# Patient Record
Sex: FEMALE | Race: WHITE | NOT HISPANIC OR LATINO | Employment: OTHER | URBAN - METROPOLITAN AREA
[De-identification: names, ages, dates, MRNs, and addresses within clinical notes are randomized per-mention and may not be internally consistent; named-entity substitution may affect disease eponyms.]

---

## 2017-02-15 ENCOUNTER — CONVERSION ENCOUNTER (OUTPATIENT)
Dept: SURGERY | Facility: CLINIC | Age: 82
End: 2017-02-15

## 2018-02-21 ENCOUNTER — CONVERSION ENCOUNTER (OUTPATIENT)
Dept: SURGERY | Facility: CLINIC | Age: 83
End: 2018-02-21

## 2018-03-06 ENCOUNTER — CONVERSION ENCOUNTER (OUTPATIENT)
Dept: SURGERY | Facility: CLINIC | Age: 83
End: 2018-03-06

## 2018-06-14 ENCOUNTER — CONVERSION ENCOUNTER (OUTPATIENT)
Dept: SURGERY | Facility: CLINIC | Age: 83
End: 2018-06-14

## 2019-02-27 ENCOUNTER — CONVERSION ENCOUNTER (OUTPATIENT)
Dept: SURGERY | Facility: CLINIC | Age: 84
End: 2019-02-27

## 2019-06-04 VITALS
HEART RATE: 61 BPM | HEART RATE: 54 BPM | WEIGHT: 156 LBS | WEIGHT: 150 LBS | OXYGEN SATURATION: 97 % | DIASTOLIC BLOOD PRESSURE: 63 MMHG | DIASTOLIC BLOOD PRESSURE: 68 MMHG | OXYGEN SATURATION: 96 % | WEIGHT: 144 LBS | HEART RATE: 66 BPM | HEIGHT: 65 IN | DIASTOLIC BLOOD PRESSURE: 82 MMHG | HEART RATE: 59 BPM | BODY MASS INDEX: 24.99 KG/M2 | SYSTOLIC BLOOD PRESSURE: 136 MMHG | BODY MASS INDEX: 25.13 KG/M2 | OXYGEN SATURATION: 95 % | DIASTOLIC BLOOD PRESSURE: 81 MMHG | HEART RATE: 53 BPM | OXYGEN SATURATION: 94 % | SYSTOLIC BLOOD PRESSURE: 131 MMHG | DIASTOLIC BLOOD PRESSURE: 82 MMHG | SYSTOLIC BLOOD PRESSURE: 146 MMHG | BODY MASS INDEX: 25.49 KG/M2 | WEIGHT: 151 LBS | SYSTOLIC BLOOD PRESSURE: 143 MMHG | WEIGHT: 153 LBS | OXYGEN SATURATION: 97 % | HEIGHT: 65 IN | SYSTOLIC BLOOD PRESSURE: 169 MMHG

## 2020-06-28 ENCOUNTER — APPOINTMENT (OUTPATIENT)
Dept: CT IMAGING | Facility: HOSPITAL | Age: 85
End: 2020-06-28

## 2020-06-28 ENCOUNTER — APPOINTMENT (OUTPATIENT)
Dept: GENERAL RADIOLOGY | Facility: HOSPITAL | Age: 85
End: 2020-06-28

## 2020-06-28 ENCOUNTER — HOSPITAL ENCOUNTER (INPATIENT)
Facility: HOSPITAL | Age: 85
LOS: 2 days | Discharge: NURSING, REHAB OR LONG-TERM CARE | End: 2020-07-01
Attending: EMERGENCY MEDICINE | Admitting: INTERNAL MEDICINE

## 2020-06-28 DIAGNOSIS — G45.9 TIA (TRANSIENT ISCHEMIC ATTACK): Primary | ICD-10-CM

## 2020-06-28 LAB
ALBUMIN SERPL-MCNC: 4.4 G/DL (ref 3.5–5.2)
ALBUMIN/GLOB SERPL: 2.3 G/DL
ALP SERPL-CCNC: 70 U/L (ref 39–117)
ALT SERPL W P-5'-P-CCNC: 14 U/L (ref 1–33)
ANION GAP SERPL CALCULATED.3IONS-SCNC: 9 MMOL/L (ref 5–15)
APTT PPP: 24.8 SECONDS (ref 24–31)
AST SERPL-CCNC: 18 U/L (ref 1–32)
BASOPHILS # BLD AUTO: 0 10*3/MM3 (ref 0–0.2)
BASOPHILS NFR BLD AUTO: 0.7 % (ref 0–1.5)
BILIRUB SERPL-MCNC: 0.5 MG/DL (ref 0.2–1.2)
BUN BLD-MCNC: 21 MG/DL (ref 8–23)
BUN BLD-MCNC: ABNORMAL MG/DL
BUN/CREAT SERPL: ABNORMAL
CALCIUM SPEC-SCNC: 9.6 MG/DL (ref 8.6–10.5)
CHLORIDE SERPL-SCNC: 104 MMOL/L (ref 98–107)
CO2 SERPL-SCNC: 30 MMOL/L (ref 22–29)
CREAT BLD-MCNC: 0.85 MG/DL (ref 0.57–1)
DEPRECATED RDW RBC AUTO: 47.3 FL (ref 37–54)
EOSINOPHIL # BLD AUTO: 0.2 10*3/MM3 (ref 0–0.4)
EOSINOPHIL NFR BLD AUTO: 2.2 % (ref 0.3–6.2)
ERYTHROCYTE [DISTWIDTH] IN BLOOD BY AUTOMATED COUNT: 14.8 % (ref 12.3–15.4)
GFR SERPL CREATININE-BSD FRML MDRD: 64 ML/MIN/1.73
GLOBULIN UR ELPH-MCNC: 1.9 GM/DL
GLUCOSE BLD-MCNC: 84 MG/DL (ref 65–99)
GLUCOSE BLDC GLUCOMTR-MCNC: 92 MG/DL (ref 70–105)
HCT VFR BLD AUTO: 35.1 % (ref 34–46.6)
HGB BLD-MCNC: 12.1 G/DL (ref 12–15.9)
HOLD SPECIMEN: NORMAL
INR PPP: 0.99 (ref 0.9–1.1)
LYMPHOCYTES # BLD AUTO: 0.9 10*3/MM3 (ref 0.7–3.1)
LYMPHOCYTES NFR BLD AUTO: 12.1 % (ref 19.6–45.3)
MCH RBC QN AUTO: 31.1 PG (ref 26.6–33)
MCHC RBC AUTO-ENTMCNC: 34.4 G/DL (ref 31.5–35.7)
MCV RBC AUTO: 90.2 FL (ref 79–97)
MONOCYTES # BLD AUTO: 0.7 10*3/MM3 (ref 0.1–0.9)
MONOCYTES NFR BLD AUTO: 9.9 % (ref 5–12)
NEUTROPHILS # BLD AUTO: 5.3 10*3/MM3 (ref 1.7–7)
NEUTROPHILS NFR BLD AUTO: 75.1 % (ref 42.7–76)
NRBC BLD AUTO-RTO: 0 /100 WBC (ref 0–0.2)
PLATELET # BLD AUTO: 217 10*3/MM3 (ref 140–450)
PMV BLD AUTO: 8 FL (ref 6–12)
POTASSIUM BLD-SCNC: 4.5 MMOL/L (ref 3.5–5.2)
PROT SERPL-MCNC: 6.3 G/DL (ref 6–8.5)
PROTHROMBIN TIME: 10.4 SECONDS (ref 9.6–11.7)
RBC # BLD AUTO: 3.89 10*6/MM3 (ref 3.77–5.28)
SODIUM BLD-SCNC: 143 MMOL/L (ref 136–145)
T4 FREE SERPL-MCNC: 1.06 NG/DL (ref 0.93–1.7)
TROPONIN T SERPL-MCNC: <0.01 NG/ML (ref 0–0.03)
WBC NRBC COR # BLD: 7 10*3/MM3 (ref 3.4–10.8)
WHOLE BLOOD HOLD SPECIMEN: NORMAL
WHOLE BLOOD HOLD SPECIMEN: NORMAL

## 2020-06-28 PROCEDURE — G0378 HOSPITAL OBSERVATION PER HR: HCPCS

## 2020-06-28 PROCEDURE — 71045 X-RAY EXAM CHEST 1 VIEW: CPT

## 2020-06-28 PROCEDURE — 85730 THROMBOPLASTIN TIME PARTIAL: CPT | Performed by: EMERGENCY MEDICINE

## 2020-06-28 PROCEDURE — 84439 ASSAY OF FREE THYROXINE: CPT | Performed by: PSYCHIATRY & NEUROLOGY

## 2020-06-28 PROCEDURE — 80053 COMPREHEN METABOLIC PANEL: CPT | Performed by: EMERGENCY MEDICINE

## 2020-06-28 PROCEDURE — 93005 ELECTROCARDIOGRAM TRACING: CPT | Performed by: EMERGENCY MEDICINE

## 2020-06-28 PROCEDURE — 84484 ASSAY OF TROPONIN QUANT: CPT | Performed by: EMERGENCY MEDICINE

## 2020-06-28 PROCEDURE — 99285 EMERGENCY DEPT VISIT HI MDM: CPT

## 2020-06-28 PROCEDURE — 70450 CT HEAD/BRAIN W/O DYE: CPT

## 2020-06-28 PROCEDURE — 85610 PROTHROMBIN TIME: CPT | Performed by: EMERGENCY MEDICINE

## 2020-06-28 PROCEDURE — 99219 PR INITIAL OBSERVATION CARE/DAY 50 MINUTES: CPT | Performed by: HOSPITALIST

## 2020-06-28 PROCEDURE — 94640 AIRWAY INHALATION TREATMENT: CPT

## 2020-06-28 PROCEDURE — 36415 COLL VENOUS BLD VENIPUNCTURE: CPT

## 2020-06-28 PROCEDURE — 0 IOPAMIDOL PER 1 ML: Performed by: EMERGENCY MEDICINE

## 2020-06-28 PROCEDURE — 85025 COMPLETE CBC W/AUTO DIFF WBC: CPT | Performed by: EMERGENCY MEDICINE

## 2020-06-28 PROCEDURE — 82962 GLUCOSE BLOOD TEST: CPT

## 2020-06-28 PROCEDURE — 70496 CT ANGIOGRAPHY HEAD: CPT

## 2020-06-28 PROCEDURE — 70498 CT ANGIOGRAPHY NECK: CPT

## 2020-06-28 PROCEDURE — 99221 1ST HOSP IP/OBS SF/LOW 40: CPT | Performed by: PSYCHIATRY & NEUROLOGY

## 2020-06-28 RX ORDER — SENNOSIDES 8.6 MG
650 CAPSULE ORAL 2 TIMES DAILY
COMMUNITY

## 2020-06-28 RX ORDER — LISINOPRIL 5 MG/1
15 TABLET ORAL DAILY
COMMUNITY

## 2020-06-28 RX ORDER — ASPIRIN 81 MG/1
81 TABLET ORAL DAILY
Status: DISCONTINUED | OUTPATIENT
Start: 2020-06-28 | End: 2020-07-01 | Stop reason: HOSPADM

## 2020-06-28 RX ORDER — GUAIFENESIN AND DEXTROMETHORPHAN HYDROBROMIDE 100; 10 MG/5ML; MG/5ML
10 SOLUTION ORAL EVERY 4 HOURS PRN
COMMUNITY

## 2020-06-28 RX ORDER — IBUPROFEN 400 MG/1
400 TABLET ORAL 2 TIMES DAILY PRN
COMMUNITY

## 2020-06-28 RX ORDER — CHOLECALCIFEROL (VITAMIN D3) 125 MCG
10 CAPSULE ORAL NIGHTLY
Status: DISCONTINUED | OUTPATIENT
Start: 2020-06-28 | End: 2020-07-01 | Stop reason: HOSPADM

## 2020-06-28 RX ORDER — SODIUM CHLORIDE 0.9 % (FLUSH) 0.9 %
10 SYRINGE (ML) INJECTION AS NEEDED
Status: DISCONTINUED | OUTPATIENT
Start: 2020-06-28 | End: 2020-07-01 | Stop reason: HOSPADM

## 2020-06-28 RX ORDER — MEMANTINE HYDROCHLORIDE 10 MG/1
10 TABLET ORAL 2 TIMES DAILY
Status: DISCONTINUED | OUTPATIENT
Start: 2020-06-28 | End: 2020-07-01 | Stop reason: HOSPADM

## 2020-06-28 RX ORDER — METOPROLOL TARTRATE 50 MG/1
50 TABLET, FILM COATED ORAL 2 TIMES DAILY
COMMUNITY

## 2020-06-28 RX ORDER — PANTOPRAZOLE SODIUM 40 MG/1
40 TABLET, DELAYED RELEASE ORAL DAILY
Status: DISCONTINUED | OUTPATIENT
Start: 2020-06-29 | End: 2020-07-01 | Stop reason: HOSPADM

## 2020-06-28 RX ORDER — LANOLIN ALCOHOL/MO/W.PET/CERES
9 CREAM (GRAM) TOPICAL NIGHTLY
COMMUNITY

## 2020-06-28 RX ORDER — CLOPIDOGREL BISULFATE 75 MG/1
75 TABLET ORAL DAILY
Status: DISCONTINUED | OUTPATIENT
Start: 2020-06-28 | End: 2020-07-01 | Stop reason: HOSPADM

## 2020-06-28 RX ORDER — ATORVASTATIN CALCIUM 40 MG/1
40 TABLET, FILM COATED ORAL NIGHTLY
Status: DISCONTINUED | OUTPATIENT
Start: 2020-06-28 | End: 2020-07-01 | Stop reason: HOSPADM

## 2020-06-28 RX ORDER — FLUTICASONE PROPIONATE 50 MCG
2 SPRAY, SUSPENSION (ML) NASAL DAILY
COMMUNITY

## 2020-06-28 RX ORDER — MEMANTINE HYDROCHLORIDE 10 MG/1
10 TABLET ORAL 2 TIMES DAILY
COMMUNITY

## 2020-06-28 RX ORDER — ATORVASTATIN CALCIUM 40 MG/1
80 TABLET, FILM COATED ORAL NIGHTLY
Status: DISCONTINUED | OUTPATIENT
Start: 2020-06-29 | End: 2020-06-28

## 2020-06-28 RX ORDER — PANTOPRAZOLE SODIUM 40 MG/1
40 TABLET, DELAYED RELEASE ORAL DAILY
COMMUNITY

## 2020-06-28 RX ORDER — GUAIFENESIN 600 MG/1
600 TABLET, EXTENDED RELEASE ORAL 2 TIMES DAILY
COMMUNITY

## 2020-06-28 RX ORDER — CLOPIDOGREL BISULFATE 75 MG/1
75 TABLET ORAL ONCE
Status: DISCONTINUED | OUTPATIENT
Start: 2020-06-28 | End: 2020-06-28

## 2020-06-28 RX ORDER — ATORVASTATIN CALCIUM 10 MG/1
10 TABLET, FILM COATED ORAL DAILY
COMMUNITY
End: 2020-07-01 | Stop reason: HOSPADM

## 2020-06-28 RX ORDER — CETIRIZINE HYDROCHLORIDE 10 MG/1
10 TABLET ORAL DAILY
COMMUNITY

## 2020-06-28 RX ORDER — POLYETHYLENE GLYCOL 3350 17 G/17G
17 POWDER, FOR SOLUTION ORAL DAILY
COMMUNITY

## 2020-06-28 RX ORDER — DOCUSATE SODIUM 100 MG/1
100 CAPSULE, LIQUID FILLED ORAL DAILY
COMMUNITY

## 2020-06-28 RX ORDER — CLONIDINE HYDROCHLORIDE 0.1 MG/1
0.1 TABLET ORAL 2 TIMES DAILY PRN
COMMUNITY

## 2020-06-28 RX ORDER — DONEPEZIL HYDROCHLORIDE 10 MG/1
10 TABLET, FILM COATED ORAL DAILY
COMMUNITY

## 2020-06-28 RX ORDER — DONEPEZIL HYDROCHLORIDE 5 MG/1
10 TABLET, FILM COATED ORAL DAILY
Status: DISCONTINUED | OUTPATIENT
Start: 2020-06-29 | End: 2020-07-01 | Stop reason: HOSPADM

## 2020-06-28 RX ORDER — ASPIRIN 81 MG/1
81 TABLET, CHEWABLE ORAL DAILY
COMMUNITY

## 2020-06-28 RX ORDER — METOPROLOL TARTRATE 50 MG/1
50 TABLET, FILM COATED ORAL 2 TIMES DAILY
Status: DISCONTINUED | OUTPATIENT
Start: 2020-06-28 | End: 2020-06-28

## 2020-06-28 RX ORDER — SODIUM CHLORIDE 9 MG/ML
100 INJECTION, SOLUTION INTRAVENOUS CONTINUOUS
Status: DISCONTINUED | OUTPATIENT
Start: 2020-06-28 | End: 2020-07-01 | Stop reason: HOSPADM

## 2020-06-28 RX ORDER — BUDESONIDE AND FORMOTEROL FUMARATE DIHYDRATE 80; 4.5 UG/1; UG/1
2 AEROSOL RESPIRATORY (INHALATION)
Status: DISCONTINUED | OUTPATIENT
Start: 2020-06-28 | End: 2020-07-01 | Stop reason: HOSPADM

## 2020-06-28 RX ORDER — CLOPIDOGREL BISULFATE 75 MG/1
75 TABLET ORAL DAILY
Status: DISCONTINUED | OUTPATIENT
Start: 2020-06-28 | End: 2020-06-28

## 2020-06-28 RX ORDER — ACETAMINOPHEN 325 MG/1
650 TABLET ORAL 2 TIMES DAILY
COMMUNITY
End: 2020-06-28

## 2020-06-28 RX ORDER — SODIUM CHLORIDE 0.9 % (FLUSH) 0.9 %
10 SYRINGE (ML) INJECTION EVERY 12 HOURS SCHEDULED
Status: DISCONTINUED | OUTPATIENT
Start: 2020-06-28 | End: 2020-07-01 | Stop reason: HOSPADM

## 2020-06-28 RX ORDER — ACETAMINOPHEN 325 MG/1
650 TABLET ORAL EVERY 4 HOURS PRN
COMMUNITY

## 2020-06-28 RX ORDER — NITROGLYCERIN 0.4 MG/1
0.4 TABLET SUBLINGUAL
Status: DISCONTINUED | OUTPATIENT
Start: 2020-06-28 | End: 2020-07-01 | Stop reason: HOSPADM

## 2020-06-28 RX ADMIN — MEMANTINE 10 MG: 10 TABLET ORAL at 20:43

## 2020-06-28 RX ADMIN — Medication 10 ML: at 20:43

## 2020-06-28 RX ADMIN — ATORVASTATIN CALCIUM 40 MG: 40 TABLET, FILM COATED ORAL at 20:43

## 2020-06-28 RX ADMIN — CLOPIDOGREL BISULFATE 75 MG: 75 TABLET ORAL at 16:40

## 2020-06-28 RX ADMIN — BUDESONIDE AND FORMOTEROL FUMARATE DIHYDRATE 2 PUFF: 80; 4.5 AEROSOL RESPIRATORY (INHALATION) at 19:20

## 2020-06-28 RX ADMIN — ASPIRIN 81 MG: 81 TABLET, COATED ORAL at 16:40

## 2020-06-28 RX ADMIN — Medication 10 ML: at 16:40

## 2020-06-28 RX ADMIN — SODIUM CHLORIDE 100 ML/HR: 900 INJECTION, SOLUTION INTRAVENOUS at 11:39

## 2020-06-28 RX ADMIN — MELATONIN TAB 5 MG 10 MG: 5 TAB at 20:43

## 2020-06-28 RX ADMIN — IOPAMIDOL 100 ML: 755 INJECTION, SOLUTION INTRAVENOUS at 10:53

## 2020-06-29 ENCOUNTER — APPOINTMENT (OUTPATIENT)
Dept: MRI IMAGING | Facility: HOSPITAL | Age: 85
End: 2020-06-29

## 2020-06-29 ENCOUNTER — APPOINTMENT (OUTPATIENT)
Dept: CARDIOLOGY | Facility: HOSPITAL | Age: 85
End: 2020-06-29

## 2020-06-29 PROBLEM — I63.9 STROKE (HCC): Status: ACTIVE | Noted: 2020-06-29

## 2020-06-29 LAB
ANION GAP SERPL CALCULATED.3IONS-SCNC: 14 MMOL/L (ref 5–15)
BH CV ECHO MEAS - ACS: 2.2 CM
BH CV ECHO MEAS - AI DEC SLOPE: 256.4 CM/SEC^2
BH CV ECHO MEAS - AI DEC TIME: 1.7 SEC
BH CV ECHO MEAS - AI MAX PG: 77.3 MMHG
BH CV ECHO MEAS - AI MAX VEL: 439.6 CM/SEC
BH CV ECHO MEAS - AI P1/2T: 502.1 MSEC
BH CV ECHO MEAS - AO MAX PG (FULL): -0.23 MMHG
BH CV ECHO MEAS - AO MAX PG: 8.9 MMHG
BH CV ECHO MEAS - AO MEAN PG (FULL): 0.64 MMHG
BH CV ECHO MEAS - AO MEAN PG: 4.8 MMHG
BH CV ECHO MEAS - AO ROOT AREA (BSA CORRECTED): 2.1
BH CV ECHO MEAS - AO ROOT AREA: 11.2 CM^2
BH CV ECHO MEAS - AO ROOT DIAM: 3.8 CM
BH CV ECHO MEAS - AO V2 MAX: 149 CM/SEC
BH CV ECHO MEAS - AO V2 MEAN: 101.3 CM/SEC
BH CV ECHO MEAS - AO V2 VTI: 27.8 CM
BH CV ECHO MEAS - AORTIC HR: 70.1 BPM
BH CV ECHO MEAS - AORTIC R-R: 0.86 SEC
BH CV ECHO MEAS - ASC AORTA: 3.1 CM
BH CV ECHO MEAS - AVA(I,A): 3.5 CM^2
BH CV ECHO MEAS - AVA(I,D): 3.5 CM^2
BH CV ECHO MEAS - AVA(V,A): 3.6 CM^2
BH CV ECHO MEAS - AVA(V,D): 3.6 CM^2
BH CV ECHO MEAS - BSA(HAYCOCK): 1.8 M^2
BH CV ECHO MEAS - BSA: 1.8 M^2
BH CV ECHO MEAS - BZI_BMI: 23.3 KILOGRAMS/M^2
BH CV ECHO MEAS - BZI_METRIC_HEIGHT: 170.2 CM
BH CV ECHO MEAS - BZI_METRIC_WEIGHT: 67.6 KG
BH CV ECHO MEAS - CI(AO): 12.2 L/MIN/M^2
BH CV ECHO MEAS - CI(LVOT): 3.8 L/MIN/M^2
BH CV ECHO MEAS - CO(AO): 21.7 L/MIN
BH CV ECHO MEAS - CO(LVOT): 6.8 L/MIN
BH CV ECHO MEAS - EDV(CUBED): 107.2 ML
BH CV ECHO MEAS - EDV(TEICH): 104.9 ML
BH CV ECHO MEAS - EF(CUBED): 77 %
BH CV ECHO MEAS - EF(MOD-BP): 69 %
BH CV ECHO MEAS - EF(TEICH): 69 %
BH CV ECHO MEAS - ESV(CUBED): 24.7 ML
BH CV ECHO MEAS - ESV(TEICH): 32.5 ML
BH CV ECHO MEAS - FS: 38.7 %
BH CV ECHO MEAS - IVS/LVPW: 1
BH CV ECHO MEAS - IVSD: 1 CM
BH CV ECHO MEAS - LA DIMENSION(2D): 3.5 CM
BH CV ECHO MEAS - LV MASS(C)D: 171.6 GRAMS
BH CV ECHO MEAS - LV MASS(C)DI: 96.2 GRAMS/M^2
BH CV ECHO MEAS - LV MAX PG: 9.1 MMHG
BH CV ECHO MEAS - LV MEAN PG: 4.1 MMHG
BH CV ECHO MEAS - LV V1 MAX: 150.9 CM/SEC
BH CV ECHO MEAS - LV V1 MEAN: 92.8 CM/SEC
BH CV ECHO MEAS - LV V1 VTI: 26.8 CM
BH CV ECHO MEAS - LVIDD: 4.8 CM
BH CV ECHO MEAS - LVIDS: 2.9 CM
BH CV ECHO MEAS - LVOT AREA: 3.6 CM^2
BH CV ECHO MEAS - LVOT DIAM: 2.1 CM
BH CV ECHO MEAS - LVPWD: 1 CM
BH CV ECHO MEAS - MV A MAX VEL: 76.7 CM/SEC
BH CV ECHO MEAS - MV DEC SLOPE: 212.8 CM/SEC^2
BH CV ECHO MEAS - MV DEC TIME: 0.27 SEC
BH CV ECHO MEAS - MV E MAX VEL: 56.5 CM/SEC
BH CV ECHO MEAS - MV E/A: 0.74
BH CV ECHO MEAS - MV MAX PG: 4 MMHG
BH CV ECHO MEAS - MV MEAN PG: 1.4 MMHG
BH CV ECHO MEAS - MV V2 MAX: 100.6 CM/SEC
BH CV ECHO MEAS - MV V2 MEAN: 55.4 CM/SEC
BH CV ECHO MEAS - MV V2 VTI: 22.8 CM
BH CV ECHO MEAS - MVA(VTI): 4.2 CM^2
BH CV ECHO MEAS - PA ACC TIME: 0.11 SEC
BH CV ECHO MEAS - PA MAX PG (FULL): 0.34 MMHG
BH CV ECHO MEAS - PA MAX PG: 2.3 MMHG
BH CV ECHO MEAS - PA MEAN PG (FULL): -0.18 MMHG
BH CV ECHO MEAS - PA MEAN PG: 0.83 MMHG
BH CV ECHO MEAS - PA PR(ACCEL): 29.3 MMHG
BH CV ECHO MEAS - PA V2 MAX: 76.6 CM/SEC
BH CV ECHO MEAS - PA V2 MEAN: 40.7 CM/SEC
BH CV ECHO MEAS - PA V2 VTI: 13.5 CM
BH CV ECHO MEAS - PULM A REVS DUR: 0.09 SEC
BH CV ECHO MEAS - PULM A REVS VEL: 32.5 CM/SEC
BH CV ECHO MEAS - PULM DIAS VEL: 34 CM/SEC
BH CV ECHO MEAS - PULM S/D: 1.3
BH CV ECHO MEAS - PULM SYS VEL: 45.6 CM/SEC
BH CV ECHO MEAS - PVA(I,A): 5.9 CM^2
BH CV ECHO MEAS - PVA(I,D): 5.9 CM^2
BH CV ECHO MEAS - PVA(V,A): 4.9 CM^2
BH CV ECHO MEAS - PVA(V,D): 4.9 CM^2
BH CV ECHO MEAS - QP/QS: 0.82
BH CV ECHO MEAS - RAP SYSTOLE: 3 MMHG
BH CV ECHO MEAS - RV MAX PG: 2 MMHG
BH CV ECHO MEAS - RV MEAN PG: 1 MMHG
BH CV ECHO MEAS - RV V1 MAX: 70.8 CM/SEC
BH CV ECHO MEAS - RV V1 MEAN: 46.7 CM/SEC
BH CV ECHO MEAS - RV V1 VTI: 14.7 CM
BH CV ECHO MEAS - RVDD: 2.3 CM
BH CV ECHO MEAS - RVOT AREA: 5.3 CM^2
BH CV ECHO MEAS - RVOT DIAM: 2.6 CM
BH CV ECHO MEAS - RVSP: 30.5 MMHG
BH CV ECHO MEAS - SI(AO): 173.5 ML/M^2
BH CV ECHO MEAS - SI(CUBED): 46.3 ML/M^2
BH CV ECHO MEAS - SI(LVOT): 54.1 ML/M^2
BH CV ECHO MEAS - SI(TEICH): 40.6 ML/M^2
BH CV ECHO MEAS - SV(AO): 309.5 ML
BH CV ECHO MEAS - SV(CUBED): 82.5 ML
BH CV ECHO MEAS - SV(LVOT): 96.5 ML
BH CV ECHO MEAS - SV(RVOT): 78.8 ML
BH CV ECHO MEAS - SV(TEICH): 72.4 ML
BH CV ECHO MEAS - TR MAX VEL: 262.1 CM/SEC
BUN BLD-MCNC: 12 MG/DL (ref 8–23)
BUN BLD-MCNC: NORMAL MG/DL
BUN/CREAT SERPL: NORMAL
CALCIUM SPEC-SCNC: 9.7 MG/DL (ref 8.6–10.5)
CHLORIDE SERPL-SCNC: 103 MMOL/L (ref 98–107)
CHOLEST SERPL-MCNC: 180 MG/DL (ref 0–200)
CO2 SERPL-SCNC: 24 MMOL/L (ref 22–29)
CREAT BLD-MCNC: 0.59 MG/DL (ref 0.57–1)
DEPRECATED RDW RBC AUTO: 45.5 FL (ref 37–54)
ERYTHROCYTE [DISTWIDTH] IN BLOOD BY AUTOMATED COUNT: 14.4 % (ref 12.3–15.4)
FOLATE SERPL-MCNC: 8.44 NG/ML (ref 4.78–24.2)
GFR SERPL CREATININE-BSD FRML MDRD: 97 ML/MIN/1.73
GLUCOSE BLD-MCNC: 80 MG/DL (ref 65–99)
HBA1C MFR BLD: 5.3 % (ref 3.5–5.6)
HCT VFR BLD AUTO: 33.6 % (ref 34–46.6)
HDLC SERPL-MCNC: 71 MG/DL (ref 40–60)
HGB BLD-MCNC: 12.1 G/DL (ref 12–15.9)
LDLC SERPL CALC-MCNC: 90 MG/DL (ref 0–100)
LDLC/HDLC SERPL: 1.27 {RATIO}
MCH RBC QN AUTO: 31.9 PG (ref 26.6–33)
MCHC RBC AUTO-ENTMCNC: 35.9 G/DL (ref 31.5–35.7)
MCV RBC AUTO: 89 FL (ref 79–97)
PLATELET # BLD AUTO: 191 10*3/MM3 (ref 140–450)
PMV BLD AUTO: 8.3 FL (ref 6–12)
POTASSIUM BLD-SCNC: 3.8 MMOL/L (ref 3.5–5.2)
RBC # BLD AUTO: 3.78 10*6/MM3 (ref 3.77–5.28)
SODIUM BLD-SCNC: 141 MMOL/L (ref 136–145)
TRIGL SERPL-MCNC: 95 MG/DL (ref 0–150)
TSH SERPL DL<=0.05 MIU/L-ACNC: 1.57 UIU/ML (ref 0.27–4.2)
VIT B12 BLD-MCNC: 558 PG/ML (ref 211–946)
VLDLC SERPL-MCNC: 19 MG/DL
WBC NRBC COR # BLD: 5.4 10*3/MM3 (ref 3.4–10.8)

## 2020-06-29 PROCEDURE — 80061 LIPID PANEL: CPT | Performed by: PSYCHIATRY & NEUROLOGY

## 2020-06-29 PROCEDURE — 97166 OT EVAL MOD COMPLEX 45 MIN: CPT

## 2020-06-29 PROCEDURE — 82607 VITAMIN B-12: CPT | Performed by: PSYCHIATRY & NEUROLOGY

## 2020-06-29 PROCEDURE — 83036 HEMOGLOBIN GLYCOSYLATED A1C: CPT | Performed by: PSYCHIATRY & NEUROLOGY

## 2020-06-29 PROCEDURE — 99231 SBSQ HOSP IP/OBS SF/LOW 25: CPT | Performed by: PSYCHIATRY & NEUROLOGY

## 2020-06-29 PROCEDURE — 70553 MRI BRAIN STEM W/O & W/DYE: CPT

## 2020-06-29 PROCEDURE — 99232 SBSQ HOSP IP/OBS MODERATE 35: CPT | Performed by: HOSPITALIST

## 2020-06-29 PROCEDURE — 82746 ASSAY OF FOLIC ACID SERUM: CPT | Performed by: PSYCHIATRY & NEUROLOGY

## 2020-06-29 PROCEDURE — 97535 SELF CARE MNGMENT TRAINING: CPT

## 2020-06-29 PROCEDURE — 80048 BASIC METABOLIC PNL TOTAL CA: CPT | Performed by: HOSPITALIST

## 2020-06-29 PROCEDURE — 97530 THERAPEUTIC ACTIVITIES: CPT

## 2020-06-29 PROCEDURE — A9579 GAD-BASE MR CONTRAST NOS,1ML: HCPCS | Performed by: HOSPITALIST

## 2020-06-29 PROCEDURE — 97162 PT EVAL MOD COMPLEX 30 MIN: CPT

## 2020-06-29 PROCEDURE — 92610 EVALUATE SWALLOWING FUNCTION: CPT

## 2020-06-29 PROCEDURE — 93306 TTE W/DOPPLER COMPLETE: CPT

## 2020-06-29 PROCEDURE — 97112 NEUROMUSCULAR REEDUCATION: CPT

## 2020-06-29 PROCEDURE — 94799 UNLISTED PULMONARY SVC/PX: CPT

## 2020-06-29 PROCEDURE — 93306 TTE W/DOPPLER COMPLETE: CPT | Performed by: INTERNAL MEDICINE

## 2020-06-29 PROCEDURE — 84443 ASSAY THYROID STIM HORMONE: CPT | Performed by: PSYCHIATRY & NEUROLOGY

## 2020-06-29 PROCEDURE — 85027 COMPLETE CBC AUTOMATED: CPT | Performed by: HOSPITALIST

## 2020-06-29 PROCEDURE — 25010000002 GADOTERIDOL PER 1 ML: Performed by: HOSPITALIST

## 2020-06-29 RX ADMIN — MELATONIN TAB 5 MG 10 MG: 5 TAB at 20:40

## 2020-06-29 RX ADMIN — MEMANTINE 10 MG: 10 TABLET ORAL at 10:14

## 2020-06-29 RX ADMIN — DONEPEZIL HYDROCHLORIDE 10 MG: 5 TABLET, FILM COATED ORAL at 10:14

## 2020-06-29 RX ADMIN — GADOTERIDOL 15 ML: 279.3 INJECTION, SOLUTION INTRAVENOUS at 08:16

## 2020-06-29 RX ADMIN — CLOPIDOGREL BISULFATE 75 MG: 75 TABLET ORAL at 10:14

## 2020-06-29 RX ADMIN — MEMANTINE 10 MG: 10 TABLET ORAL at 20:40

## 2020-06-29 RX ADMIN — Medication 10 ML: at 10:14

## 2020-06-29 RX ADMIN — PANTOPRAZOLE SODIUM 40 MG: 40 TABLET, DELAYED RELEASE ORAL at 10:14

## 2020-06-29 RX ADMIN — ASPIRIN 81 MG: 81 TABLET, COATED ORAL at 10:13

## 2020-06-29 RX ADMIN — BUDESONIDE AND FORMOTEROL FUMARATE DIHYDRATE 2 PUFF: 80; 4.5 AEROSOL RESPIRATORY (INHALATION) at 19:18

## 2020-06-29 RX ADMIN — Medication 10 ML: at 20:40

## 2020-06-29 RX ADMIN — ATORVASTATIN CALCIUM 40 MG: 40 TABLET, FILM COATED ORAL at 20:40

## 2020-06-30 LAB
ANION GAP SERPL CALCULATED.3IONS-SCNC: 13 MMOL/L (ref 5–15)
BUN SERPL-MCNC: 15 MG/DL (ref 8–23)
BUN SERPL-MCNC: ABNORMAL MG/DL
BUN/CREAT SERPL: ABNORMAL
CALCIUM SPEC-SCNC: 9.7 MG/DL (ref 8.6–10.5)
CHLORIDE SERPL-SCNC: 102 MMOL/L (ref 98–107)
CO2 SERPL-SCNC: 26 MMOL/L (ref 22–29)
CREAT SERPL-MCNC: 0.66 MG/DL (ref 0.57–1)
GFR SERPL CREATININE-BSD FRML MDRD: 85 ML/MIN/1.73
GLUCOSE SERPL-MCNC: 112 MG/DL (ref 65–99)
POTASSIUM SERPL-SCNC: 3.9 MMOL/L (ref 3.5–5.2)
SARS-COV-2 RNA PNL SPEC NAA+PROBE: NOT DETECTED
SODIUM SERPL-SCNC: 141 MMOL/L (ref 136–145)

## 2020-06-30 PROCEDURE — 99233 SBSQ HOSP IP/OBS HIGH 50: CPT | Performed by: INTERNAL MEDICINE

## 2020-06-30 PROCEDURE — 25010000002 ONDANSETRON PER 1 MG: Performed by: PSYCHIATRY & NEUROLOGY

## 2020-06-30 PROCEDURE — 92523 SPEECH SOUND LANG COMPREHEN: CPT

## 2020-06-30 PROCEDURE — 97530 THERAPEUTIC ACTIVITIES: CPT

## 2020-06-30 PROCEDURE — 97535 SELF CARE MNGMENT TRAINING: CPT

## 2020-06-30 PROCEDURE — 80048 BASIC METABOLIC PNL TOTAL CA: CPT | Performed by: HOSPITALIST

## 2020-06-30 PROCEDURE — 94799 UNLISTED PULMONARY SVC/PX: CPT

## 2020-06-30 PROCEDURE — 97116 GAIT TRAINING THERAPY: CPT

## 2020-06-30 PROCEDURE — 99231 SBSQ HOSP IP/OBS SF/LOW 25: CPT | Performed by: PSYCHIATRY & NEUROLOGY

## 2020-06-30 PROCEDURE — 92526 ORAL FUNCTION THERAPY: CPT

## 2020-06-30 PROCEDURE — 87635 SARS-COV-2 COVID-19 AMP PRB: CPT | Performed by: INTERNAL MEDICINE

## 2020-06-30 RX ORDER — ONDANSETRON 2 MG/ML
4 INJECTION INTRAMUSCULAR; INTRAVENOUS EVERY 6 HOURS PRN
Status: DISCONTINUED | OUTPATIENT
Start: 2020-06-30 | End: 2020-07-01 | Stop reason: HOSPADM

## 2020-06-30 RX ADMIN — ASPIRIN 81 MG: 81 TABLET, COATED ORAL at 09:05

## 2020-06-30 RX ADMIN — BUDESONIDE AND FORMOTEROL FUMARATE DIHYDRATE 2 PUFF: 80; 4.5 AEROSOL RESPIRATORY (INHALATION) at 07:27

## 2020-06-30 RX ADMIN — Medication 10 ML: at 09:05

## 2020-06-30 RX ADMIN — ONDANSETRON 4 MG: 2 INJECTION INTRAMUSCULAR; INTRAVENOUS at 09:53

## 2020-06-30 RX ADMIN — MEMANTINE 10 MG: 10 TABLET ORAL at 20:21

## 2020-06-30 RX ADMIN — ATORVASTATIN CALCIUM 40 MG: 40 TABLET, FILM COATED ORAL at 20:21

## 2020-06-30 RX ADMIN — BUDESONIDE AND FORMOTEROL FUMARATE DIHYDRATE 2 PUFF: 80; 4.5 AEROSOL RESPIRATORY (INHALATION) at 19:01

## 2020-06-30 RX ADMIN — MELATONIN TAB 5 MG 10 MG: 5 TAB at 20:21

## 2020-06-30 RX ADMIN — MEMANTINE 10 MG: 10 TABLET ORAL at 09:05

## 2020-06-30 RX ADMIN — Medication 10 ML: at 20:22

## 2020-06-30 RX ADMIN — PANTOPRAZOLE SODIUM 40 MG: 40 TABLET, DELAYED RELEASE ORAL at 09:05

## 2020-06-30 RX ADMIN — DONEPEZIL HYDROCHLORIDE 10 MG: 5 TABLET, FILM COATED ORAL at 09:05

## 2020-06-30 RX ADMIN — CLOPIDOGREL BISULFATE 75 MG: 75 TABLET ORAL at 09:05

## 2020-07-01 VITALS
TEMPERATURE: 97.1 F | BODY MASS INDEX: 22.84 KG/M2 | WEIGHT: 145.5 LBS | DIASTOLIC BLOOD PRESSURE: 55 MMHG | RESPIRATION RATE: 14 BRPM | HEART RATE: 79 BPM | HEIGHT: 67 IN | SYSTOLIC BLOOD PRESSURE: 119 MMHG | OXYGEN SATURATION: 95 %

## 2020-07-01 PROBLEM — I10 ESSENTIAL HYPERTENSION: Chronic | Status: ACTIVE | Noted: 2020-07-01

## 2020-07-01 PROBLEM — K21.9 GERD WITHOUT ESOPHAGITIS: Chronic | Status: ACTIVE | Noted: 2020-07-01

## 2020-07-01 PROBLEM — F03.90 DEMENTIA WITHOUT BEHAVIORAL DISTURBANCE (HCC): Chronic | Status: ACTIVE | Noted: 2020-07-01

## 2020-07-01 LAB
ANION GAP SERPL CALCULATED.3IONS-SCNC: 14 MMOL/L (ref 5–15)
BUN SERPL-MCNC: 15 MG/DL (ref 8–23)
BUN SERPL-MCNC: ABNORMAL MG/DL
BUN/CREAT SERPL: ABNORMAL
CALCIUM SPEC-SCNC: 9.5 MG/DL (ref 8.6–10.5)
CHLORIDE SERPL-SCNC: 100 MMOL/L (ref 98–107)
CO2 SERPL-SCNC: 26 MMOL/L (ref 22–29)
CREAT SERPL-MCNC: 1 MG/DL (ref 0.57–1)
GFR SERPL CREATININE-BSD FRML MDRD: 53 ML/MIN/1.73
GLUCOSE SERPL-MCNC: 100 MG/DL (ref 65–99)
POTASSIUM SERPL-SCNC: 3.9 MMOL/L (ref 3.5–5.2)
SODIUM SERPL-SCNC: 140 MMOL/L (ref 136–145)

## 2020-07-01 PROCEDURE — 80048 BASIC METABOLIC PNL TOTAL CA: CPT | Performed by: HOSPITALIST

## 2020-07-01 PROCEDURE — 97530 THERAPEUTIC ACTIVITIES: CPT

## 2020-07-01 PROCEDURE — 97535 SELF CARE MNGMENT TRAINING: CPT

## 2020-07-01 PROCEDURE — 99239 HOSP IP/OBS DSCHRG MGMT >30: CPT | Performed by: INTERNAL MEDICINE

## 2020-07-01 PROCEDURE — 92507 TX SP LANG VOICE COMM INDIV: CPT

## 2020-07-01 PROCEDURE — 97112 NEUROMUSCULAR REEDUCATION: CPT

## 2020-07-01 PROCEDURE — 94799 UNLISTED PULMONARY SVC/PX: CPT

## 2020-07-01 RX ORDER — CLOPIDOGREL BISULFATE 75 MG/1
75 TABLET ORAL DAILY
Qty: 30 TABLET | Refills: 1 | Status: SHIPPED | OUTPATIENT
Start: 2020-07-02

## 2020-07-01 RX ORDER — ATORVASTATIN CALCIUM 40 MG/1
40 TABLET, FILM COATED ORAL NIGHTLY
Qty: 30 TABLET | Refills: 1 | Status: SHIPPED | OUTPATIENT
Start: 2020-07-01

## 2020-07-01 RX ADMIN — BUDESONIDE AND FORMOTEROL FUMARATE DIHYDRATE 2 PUFF: 80; 4.5 AEROSOL RESPIRATORY (INHALATION) at 07:57

## 2020-07-01 RX ADMIN — MEMANTINE 10 MG: 10 TABLET ORAL at 09:01

## 2020-07-01 RX ADMIN — CLOPIDOGREL BISULFATE 75 MG: 75 TABLET ORAL at 09:01

## 2020-07-01 RX ADMIN — DONEPEZIL HYDROCHLORIDE 10 MG: 5 TABLET, FILM COATED ORAL at 09:01

## 2020-07-01 RX ADMIN — PANTOPRAZOLE SODIUM 40 MG: 40 TABLET, DELAYED RELEASE ORAL at 09:01

## 2020-07-01 RX ADMIN — ASPIRIN 81 MG: 81 TABLET, COATED ORAL at 09:01

## 2020-07-01 NOTE — THERAPY TREATMENT NOTE
Acute Care - Speech Language Pathology Progress Note   [place]     Patient Name: Aida Grey  : 1935  MRN: [id number]  Today's Date: 2020         Admit Date: 2020    Visit Dx:      ICD-10-CM ICD-9-CM   1. TIA (transient ischemic attack) G45.9 435.9     Patient Active Problem List   Diagnosis   • TIA (transient ischemic attack)   • Stroke (CMS/HCC)   • Essential hypertension   • GERD without esophagitis   • Dementia without behavioral disturbance (CMS/HCC)        Therapy Treatment    PPE worn: Gloves, surgical mask, face shield  Rehabilitation Treatment Summary     Row Name 20 1500          Discipline  speech language pathologist  -SM    Recorded by [] Deirdre Briseno SLP 20 1558    Row Name 20 1500          Automatic Speech (Communication)  days of week;response to greeting;counting 1-20;WFL  -SM    Repetition  words;phrases;sentences;WFL  -SM    Spontaneous/Functional Words  moderate impairment  -SM    Recorded by [] Deirdre Briseno SLP 20 1558      User Key  (r) = Recorded By, (t) = Taken By, (c) = Cosigned By    Initials Name Effective Dates Discipline     Deirdre Briseno SLP 19 -  SLP          EDUCATION  The patient has been educated in the following areas:   Communication Impairment.    SLP Recommendation and Plan                         SLP GOALS     Row Name 20 1500 20 1600          Oral Nutrition/Hydration Goal 1, SLP  --  Patient will be seen at a meal within 24-48 hours to assess tolerance of current diet with further recommendations to be given as indicated  -LF    Time Frame (Oral Nutrition/Hydration Goal 1, SLP)  --  2 days  -LF    Barriers (Oral Nutrition/Hydration Goal 1, SLP)  --  Pt seen with lunch tray to assess diet tolerance this date. Lunch tray included chicken caesar salad and tea by straw. Pt fed by sister. Pt took small bites and exhibited extended but functional mastication. No anterior labial spillage observed. Mild oral  residue observed, which cleared after patient was cued to take a thin liquid wash. No clinical s/s of aspiration observed at any time.   -LF    Progress/Outcomes (Oral Nutrition/Hydration Goal 1, SLP)  --  continuing progress toward goal  -LF          Oral Nutrition/Hydration Goal 2, SLP  --  Patient will tolerate safest and least restrictive diet with no complications from aspiration  -LF    Time Frame (Oral Nutrition/Hydration Goal 2, SLP)  --  by discharge  -LF    Barriers (Oral Nutrition/Hydration Goal 2, SLP)  --  Pt currently tolerating regular and thin liquid diet with no complications from aspiration  -LF    Progress/Outcomes (Oral Nutrition/Hydration Goal 2, SLP)  --  continuing progress toward goal  -LF          Improve Attention by Goal 1 (SLP)  --  --    Time Frame (Attention Goal 1, SLP)  --  --          Improve Orientation Through Goal 1 (SLP)  demonstrating orientation to place;demonstrating orientation to day;80%;with minimal cues (75-90%)  -SM  --    Time Frame (Orientation Goal 1, SLP)  by discharge  -SM  --          Improve Memory Skills Through Goal 1 (SLP)  recalling related word lists with an imposed delay;recalling unrelated word lists with an imposed delay;80%;with minimal cues (75-90%)  -SM  --    Time Frame (Memory Skills Goal 1, SLP)  by discharge  -SM  --    Progress/Outcomes (Memory Skills Goal 1, SLP)  goal not met  -SM  --          Improve Problem Solving Through Goal 1 (SLP)  determine solutions to simple ADL/safety problems;80%;with minimal cues (75-90%)  -SM  --    Time Frame (Problem Solving Goal 1, SLP)  by discharge  -SM  --    Progress/Outcomes (Problem Solving Goal 1, SLP)  goal not met  -SM  --          Additional Goal 1, SLP  Pt will verbalize and demonstrate understanding of diaphragmatic breathing with 80% accuracy and minimal cues given  -SM  --    Time Frame (Additional Goal 1, SLP)  by discharge  -SM  --    Barriers (Additional Goal 1, SLP)  Pt seen bedside with family  "member present. Pt possibly to be discharged today and very \"antsy\" about this. Pt able to verbalize that she has been here for \"hours\" and asking why she hasn't been discharged, asking for the doctor, and other such requests. Pt unable to participate in swallow therapy at this time as having to go to bathroom. Caregiver reported pt did eat lunch and tolerated well. Discussed diet and recommendations to continue at d/c. Plan is for pt to transfer to [facility] today. Pt with increased verbalizations and intelligibility today. Pt difficult to keep on task due to anxiety about potential discharge and needing to go to bathroom. Discussed at length with caregiver, nurse and during multidisciplinary rounds.   -SM  --    Progress/Outcomes (Additional Goal 1, SLP)  goal ongoing  -SM  --          Additional Goal 2, SLP  --  --    Time Frame (Additional Goal 2, SLP)  --  --    Row Name 06/29/20 1000             Oral Nutrition/Hydration Goal 1 (SLP)    Oral Nutrition/Hydration Goal 1, SLP  Patient will be seen at a meal within 24-48 hours to assess tolerance of current diet with further recommendations to be given as indicated  -MM      Time Frame (Oral Nutrition/Hydration Goal 1, SLP)  2 days  -MM         Oral Nutrition/Hydration Goal 2 (SLP)    Oral Nutrition/Hydration Goal 2, SLP  Patient will tolerate safest and least restrictive diet with no complications from aspiration  -MM      Time Frame (Oral Nutrition/Hydration Goal 2, SLP)  by discharge  -MM        User Key  (r) = Recorded By, (t) = Taken By, (c) = Cosigned By    Initials Name Provider Type    Deirdre Jaquez, SLP Speech and Language Pathologist    Jia Jackson, SLP Speech and Language Pathologist    Ghislaine Aguayo, SLP Speech and Language Pathologist              Time Calculation:                        DANIELITO Jett  7/1/2020    "

## 2020-07-01 NOTE — THERAPY TREATMENT NOTE
Patient Name: Aida Grey  : 1935    MRN: [id number]                              Today's Date: 2020       Admit Date: 2020    Visit Dx:     ICD-10-CM ICD-9-CM   1. TIA (transient ischemic attack) G45.9 435.9     Patient Active Problem List   Diagnosis   • TIA (transient ischemic attack)   • Stroke (CMS/HCC)   • Essential hypertension   • GERD without esophagitis   • Dementia without behavioral disturbance (CMS/HCC)     No past medical history on file.  No past surgical history on file.  General Information     Row Name 20 1246          PT Evaluation Time/Intention    Document Type  therapy note (daily note)  -     Mode of Treatment  physical therapy  -     Row Name 20 1246          General Information    Existing Precautions/Restrictions  fall  -     Row Name 20 1246          Safety Issues, Functional Mobility    Impairments Affecting Function (Mobility)  cognition;endurance/activity tolerance;strength;motor control;grasp;balance;postural/trunk control;muscle tone abnormal  -       User Key  (r) = Recorded By, (t) = Taken By, (c) = Cosigned By    Initials Name Provider Type    SS Shauna Copeland, PT Physical Therapist        Mobility     Row Name 20 1247          Bed Mobility Assessment/Treatment    [place] Level (Bed Mobility)  maximum assist (25% patient effort)  -     Comment (Bed Mobility)  increased assist required this date   -     Row Name 20 1247          Transfer Assessment/Treatment    Comment (Transfers)  increased assist required with transfers, increased forward flexion noted. Patient with poor postural stability. Use of RW not helpful for transfers this date and pt not appropriate to progress to ambulation. Fatigue appears to be higher this date. patient transfers from bed to BSC and BSC to recliner.   -     Row Name 20 1247          Bed-Chair Transfer    Bed-Chair [place] (Transfers)  maximum assist (25% patient  effort)  -     Row Name 07/01/20 1247          Sit-Stand Transfer    Sit-Stand [place] (Transfers)  moderate assist (50% patient effort)  -       User Key  (r) = Recorded By, (t) = Taken By, (c) = Cosigned By    Initials Name Provider Type    Shauna Lopez PT Physical Therapist        Obj/Interventions     Row Name 07/01/20 1249          Static Sitting Balance    Level of [place] (Unsupported Sitting, Static Balance)  minimal assist, 75% patient effort;moderate assist, 50 to 74% patient effort  -     Sitting Position (Unsupported Sitting, Static Balance)  sitting on edge of bed  -     Time Able to Maintain Position (Unsupported Sitting, Static Balance)  4 to 5 minutes  -     Row Name 07/01/20 1249          Static Standing Balance    Level of [place] (Supported Standing, Static Balance)  moderate assist, 50 to 74% patient effort  -SS     Time Able to Maintain Position (Supported Standing, Static Balance)  2 to 3 minutes  -SS     Comment (Supported Standing, Static Balance)  must support R UEdue to flaccidity and forward flexed posture. Max A for osito hygiene   -       User Key  (r) = Recorded By, (t) = Taken By, (c) = Cosigned By    Initials Name Provider Type    Shauna Lopez PT Physical Therapist        Goals/Plan    No documentation.       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 Patient with history of dementia and asks why her arm is so heavy, cannot recall her situation. Reports pain with urination. She appeared to have increased fatigue this date- required more assist for bed mobility and transfers and was not appropriate for  ambulation. Pt with frequent anterior and R sided LOB in sitting with flaccid R UE hanging dependently when not supported. Will require IP rehab at d/c. PPE: mask, face shield, gloves.   -     Row Name 07/01/20 1250          Positioning and Restraints    Pre-Treatment Position  in bed  -SS     Post Treatment Position  chair  -SS     In Chair  exit alarm on  -       User Key  (r) = Recorded By, (t) = Taken By, (c) = Cosigned By    Initials Name Provider Type    Shauna Lopez PT Physical Therapist        Outcome Measures     Row Name 07/01/20 1255          Modified Beaver Scale    Pre-Stroke Modified Beaver Scale  0 - No Symptoms at all.  -     Modified [place] Scale  4 - Moderately severe disability.  Unable to walk without assistance, and unable to attend to own bodily needs without assistance.  -       User Key  (r) = Recorded By, (t) = Taken By, (c) = Cosigned By    Initials Name Provider Type    Shauna Lopez PT Physical Therapist        Physical Therapy Education                 Title: PT OT SLP Therapies (In Progress)     Topic: Physical Therapy (Done)     Point: Mobility training (Done)     Description:   Instruct learner(s) on safety and technique for assisting patient out of bed, chair or wheelchair.  Instruct in the proper use of assistive devices, such as walker, crutches, cane or brace.              Patient Friendly Description:   It's important to get you on your feet again, but we need to do so in a way that is safe for you. Falling has serious consequences, and your personal safety is the most important thing of all.        When it's time to get out of bed, one of us or a family member will sit next to you on the bed to give you support.     If your doctor or nurse tells you to use a walker, crutches, a cane, or a brace, be sure you use it every time you get out of bed, even if you think you don't need it.    Learning Progress Summary           Patient Acceptance, E, VU by   at 7/1/2020 1256    Acceptance, E, NL by SC at 6/30/2020 1735                   Point: Body mechanics (Done)     Description:   Instruct learner(s) on proper positioning and spine alignment for patient and/or caregiver during mobility tasks and/or exercises.              Learning Progress Summary           Patient Acceptance, E, VU by  at 7/1/2020 1256    Acceptance, E, NL by SC at 6/30/2020 1735                               User Key     Initials Effective Dates Name Provider Type Discipline     06/19/19 -  Shauna Copeland, PT Physical Therapist PT    SC 03/01/19 -  Glenys Baig, LAYNE Physical Therapy Assistant PT              PT Recommendation and Plan  Planned Therapy Interventions (PT Eval): balance training, bed mobility training, gait training, home exercise program, postural re-education, patient/family education, transfer training, strengthening, neuromuscular re-education, motor coordination training  Outcome Summary/Treatment Plan (PT)  Anticipated Discharge Disposition (PT): inpatient rehabilitation facility  Plan of Care Reviewed With: patient  Outcome Summary: Continued PT treatment following L subcortical stroke with R sided weakness, dysarthria. Patient with history of dementia and asks why her arm is so heavy, cannot recall her situation. Reports pain with urination. She appeared to have increased fatigue this date- required more assist for bed mobility and transfers and was not appropriate for ambulation. Pt with frequent anterior and R sided LOB in sitting with flaccid R UE hanging dependently when not supported. Will require IP rehab at d/c. PPE: mask, face shield, gloves.      Time Calculation:   PT Charges     Row Name 07/01/20 1256             Time Calculation    Start Time  1047  -      Stop Time  1113  -      Time Calculation (min)  26 min  -      PT Received On  07/01/20  -      PT - Next Appointment  07/03/20  -         Time Calculation- PT    Total Timed Code Minutes-  PT  26 minute(s)  -SS        User Key  (r) = Recorded By, (t) = Taken By, (c) = Cosigned By    Initials Name Provider Type    SS Shauna Copeland, PT Physical Therapist        Therapy Charges for Today     Code Description Service Date Service Provider Modifiers Qty    10132483935  PT THERAPEUTIC ACT EA 15 MIN 7/1/2020 Shauna Copeland, PT GP 1    35986551085 HC PT SELF CARE/MGMT/TRAIN EA 15 MIN 7/1/2020 Shauna Copeland, PT GP 1    [id number]  PT NEUROMUSC RE EDUCATION EA 15 MIN 7/1/2020 Shauna Copeland, PT GP 1          PT G-Codes  Outcome Measure Options: Modified Wise  Modified Kenn Scale: 4 - Moderately severe disability.  Unable to walk without assistance, and unable to attend to own bodily needs without assistance.    Shauna Copeland, MARICRUZ  7/1/2020

## 2020-07-01 NOTE — PLAN OF CARE
Pt remains confused, family at bedside. Pt will d/c to [facility] this afternoon. Pt complains of intermittent headache. Up to chair for lunch. Vitals stable, will continue to monitor.    Problem: Patient Care Overview  Goal: Plan of Care Review  Outcome: Ongoing (interventions implemented as appropriate)  Flowsheets  Taken 7/1/2020 1358 by Daphne Spence RN  Progress: no change  Taken 7/1/2020 1250 by Shauna Copeland PT  Plan of Care Reviewed With: patient  Goal: Individualization and Mutuality  Outcome: Ongoing (interventions implemented as appropriate)  Goal: Discharge Needs Assessment  Outcome: Ongoing (interventions implemented as appropriate)  Goal: Interprofessional Rounds/Family Conf  Outcome: Ongoing (interventions implemented as appropriate)     Problem: Stroke (Ischemic) (Adult)  Goal: Signs and Symptoms of Listed Potential Problems Will be Absent, Minimized or Managed (Stroke)  Outcome: Ongoing (interventions implemented as appropriate)     Problem: Fall Risk (Adult)  Goal: Identify Related Risk Factors and Signs and Symptoms  Outcome: Ongoing (interventions implemented as appropriate)  Goal: Absence of Fall  Outcome: Ongoing (interventions implemented as appropriate)     Problem: Skin Injury Risk (Adult)  Goal: Identify Related Risk Factors and Signs and Symptoms  Outcome: Ongoing (interventions implemented as appropriate)  Goal: Skin Health and Integrity  Outcome: Ongoing (interventions implemented as appropriate)

## 2020-07-01 NOTE — PLAN OF CARE
Problem: Patient Care Overview  Goal: Plan of Care Review  Outcome: Ongoing (interventions implemented as appropriate)  Flowsheets (Taken 7/1/2020 1250)  Outcome Summary: Continued PT treatment following L subcortical stroke with R sided weakness, dysarthria. Patient with history of dementia and asks why her arm is so heavy, cannot recall her situation. Reports pain with urination. She appeared to have increased fatigue this date- required more assist for bed mobility and transfers and was not appropriate for ambulation. Pt with frequent anterior and R sided LOB in sitting with flaccid R UE hanging dependently when not supported. Will require IP rehab at d/c. PPE: mask, face shield, gloves.

## 2020-07-01 NOTE — DISCHARGE SUMMARY
[facility] Services  DISCHARGE SUMMARY        Prepared For PCP:  Naseem Clarke MD    Patient Name: Aida Grey  : 1935  MRN: [id number]      Date of Admission:   2020    Date of Discharge:  2020    Length of stay:  LOS: 2 days     Hospital Course     Presenting Problem:   TIA (transient ischemic attack) [G45.9]  Stroke (CMS/HCC) [I63.9]      Active Hospital Problems    Diagnosis  POA   • **Stroke (CMS/HCC) [I63.9]  Yes     Priority: High   • Essential hypertension [I10]  Yes   • GERD without esophagitis [K21.9]  Yes   • Dementia without behavioral disturbance (CMS/HCC) [F03.90]  Yes      Resolved Hospital Problems   No resolved problems to display.           Hospital Course:  Aida Grey is a 84 y.o. female with PMHx significant for dementia, a. Fib presents to Psychiatric complaining of weakness and altered mental status. 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 Reportedly was sent from [facility] due to right sided facial droop and right sided weakness. By the time she arrived at the ER, her symptoms were improved, and patient was deemed not to be a TPA candidate. Patient currently has no complaints, does not know where she is at or the year or month. States it is the year  and that it's July. Is oriented to person, and was able to tell me she lives in [place] and has family around. Pleasantly confused, and re-directable upon time of my evaluation.      Date::    2020  Seen at bedside with daughter. Patient remains pleasantly confused. Continues to have right-sided deficits.      2020  Pt seen and examined, daughter present.  She seems to be aspirating secretions and coughing, head of bed at 12 degrees, rested up to 30 degrees and she seemed to do a little better.  Patient complaining of right-sided neck pain going into the shoulder and back.  Daughter thought it was due to the  tag on her clothing irritating her skin, however she seems to have a bit of muscle tightness or cramping in that area.  Daughter hesitant to give muscle relaxants because she feels her mother is very sensitive to medication.    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; no definite occlusions  - MRI brain: Acute/subacute 8 mm left frontal posterior corona radiata ischemic infarct  - 2D echo reviewed and noted above  - HgbA1c 5.3; Vit B12 558; LDL 90; TSH 1.570  - Neurology following  - Continue aspirin, Plavix, statin  - Continue modification of risk factors  - PT/OT/ST recommendations reviewed     - Inpatient rehab recommended at discharge: COVID screening negative     Right-sided neck pain  - Potentially due to muscle spasm versus muscular strain from patient trying to compensate for right-sided weaknesses  - Daughter hesitant to use muscle relaxants  - Recommend applying heat in the form of compresses to see if that helps to loosen up the muscles, may consider muscle rub or cream     Dementia  - Continue donepezil and Namenda     Hypertension  -Continue metoprolol     GERD  - Continue pantoprazole    She was discharged to [facility] after a negative COVID-19 screening test.      Recommendation for Outpatient Providers:             Reasons For Change In Medications and Indications for New Medications:        Day of Discharge     HPI: No new complaints per pt or family.      Vital Signs:   Temp:  [97.1 °F (36.2 °C)-99.1 °F (37.3 °C)] 97.1 °F (36.2 °C)  Heart Rate:  [74-85] 79  Resp:  [14-19] 14  BP: (102-155)/(41-76) 119/55     Physical Exam:  General: well-developed and well-nourished, very soft-spoken, NAD  HEENT: NC/AT, EOMI, PERRLA, mild right facial droop  Heart: RRR. No murmur   Chest: CTAB, no w/r/r, normal respiratory effort  Abdominal:  Soft. NT/ND. Bowel sounds present  Musculoskeletal: Normal ROM.  No edema. No calf tenderness.  Neurological: AAOx3, RUE weakness  Skin: Skin is warm and dry. No rash  Psychiatric: Normal mood and affect.    Pertinent  and/or Most Recent Results     Results from last 7 days   Lab Units 07/01/20  0413 06/30/20  0416 06/29/20  0308 06/28/20  1022   WBC 10*3/mm3  --   --  5.40 7.00   HEMOGLOBIN g/dL  --   --  12.1 12.1   HEMATOCRIT %  --   --  33.6* 35.1   PLATELETS 10*3/mm3  --   --  191 217   SODIUM mmol/L 140 141 141 143   POTASSIUM mmol/L 3.9 3.9 3.8 4.5   CHLORIDE mmol/L 100 102 103 104   CO2 mmol/L 26.0 26.0 24.0 30.0*   BUN  15 15 12 21   CREATININE mg/dL 1.00 0.66 0.59 0.85   GLUCOSE mg/dL 100* 112* 80 84   CALCIUM mg/dL 9.5 9.7 9.7 9.6     Results from last 7 days   Lab Units 06/28/20  1022   BILIRUBIN mg/dL 0.5   ALK PHOS U/L 70   ALT (SGPT) U/L 14   AST (SGOT) U/L 18   PROTIME Seconds 10.4   INR  0.99   APTT seconds 24.8     Results from last 7 days   Lab Units 06/29/20  0308   CHOLESTEROL mg/dL 180   TRIGLYCERIDES mg/dL 95   HDL CHOL mg/dL 71*     Results from last 7 days   Lab Units 06/29/20  0920 06/29/20  0308 06/28/20  1022   TSH uIU/mL  --  1.570  --    HEMOGLOBIN A1C % 5.3  --   --    TROPONIN T ng/mL  --   --  <0.010       Brief Urine Lab Results     None          Microbiology Results Abnormal     Procedure Component Value - Date/Time    COVID-19,CEPHEID,COR/RHYS/PAD IN-HOUSE(OR EMERGENT/ADD-ON),NP SWAB IN TRANSPORT MEDIA 3-4 HR TAT - Swab, Nasopharynx [[id number]]  (Normal) Collected:  06/30/20 1635    Lab Status:  Final result Specimen:  Swab from Nasopharynx Updated:  06/30/20 1825     COVID19 Not Detected    Narrative:       Fact sheet for providers: https://www.fda.gov/media/314098/download     Fact sheet for patients: https://www.fda.gov/media/581724/download          Ct Angiogram Head    Result Date: 6/28/2020  Impression: 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 No  definite occlusions are identified.  Large right thyroid nodule. Recommend nonemergent thyroid ultrasound.  Electronically Signed By-Steve Lowry On:6/28/2020 11:37 AM This report was finalized on [id number] by  Steve Lowry, .    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 No definite occlusions are identified.  Large right thyroid nodule. Recommend nonemergent thyroid ultrasound.  Electronically Signed By-Steve Lowry On:6/28/2020 11:37 AM This report was finalized on [id number] by  Steve Lowry, .    Mri Brain With & Without Contrast    Result Date: 6/29/2020  Impression:  1. 8mm acute or subacute ischemic insult in the posterior left frontal lobe. No hemorrhagic transformation. 2. Chronic right temporal lobe encephalomalacia with compensatory lateral ventricular dilation likely related to remote right MCA territory infarct. 3. Focal narrowing or stenosis of the right M2 segment corresponding to the CT a study from 06/28/2020. 4. Moderately advanced generalized atrophy, progressed since the 2013 comparison. 5. Features of advanced chronic microvascular disease, progressed since the 2013 MRI comparison.   Electronically Signed By-Dr. Erika Riojas MD On:6/29/2020 9:40 AM This report was finalized on [id number] by Dr. Erika Riojas MD.    Xr Chest 1 View    Result Date: 6/28/2020  Impression:  1. At least moderate change of COPD 2. Mild cardiomegaly without evidence of congestive failure 3. No active cardiopulmonary process  Electronically Signed By-David Reddy Jr. On:6/28/2020 11:26 AM This report was finalized on [id number] by  David Reddy Jr., .    Ct Head Without Contrast Stroke Protocol    Result Date: 6/28/2020  Impression:  1. No acute intracranial finding, no hemorrhage, mass effect or edema 2. Generalized cortical atrophy with ventricular dilatation 3. Stable volume loss in the right temporal lobe from study  of 2014  Electronically Signed By-David Reddy Jr. On:6/28/2020 10:43 AM This report was finalized on [id number] by  David Reddy Jr., .                Results for orders placed during the hospital encounter of 06/28/20   Adult Transthoracic Echo Complete W/ Cont if Necessary Per Protocol    Narrative Normal LV size and contractility EF of 60 to 65%  Normal RV size  Normal atrial size, agitated saline bubble contrast is technically   difficult but does not reveal any significant septal defects.  Aortic valve, mitral valve, tricuspid valve appears structurally normal,   mild aortic, mitral, tricuspid regurgitation seen.  Normal RV systolic   pressure at 27 mmHg  No pericardial effusion seen.  Proximal aorta appears normal in size.               Test Results Pending at Discharge        Procedures Performed           Consults:   Consults     Date and Time Order Name Status Description    6/28/2020 1154 Hospitalist (on-call MD unless specified) Completed     6/28/2020 1019 Inpatient Neurology Consult Stroke Completed     6/28/2020 1019 Inpatient Neurology Consult Stroke Completed             Discharge Details        Discharge Medications      New Medications      Instructions Start Date   clopidogrel 75 MG tablet  Commonly known as:  PLAVIX   75 mg, Oral, Daily   Start Date:  July 2, 2020     O2  Commonly known as:  OXYGEN   2 L/min (2 L/min), Inhalation, Nightly, As needed to keep sats >92%         Changes to Medications      Instructions Start Date   atorvastatin 40 MG tablet  Commonly known as:  LIPITOR  What changed:    · medication strength  · how much to take  · when to take this   40 mg, Oral, Nightly         Continue These Medications      Instructions Start Date   acetaminophen 325 MG tablet  Commonly known as:  TYLENOL   650 mg, Oral, Every 4 Hours PRN      acetaminophen 650 MG 8 hr tablet  Commonly known as:  TYLENOL   650 mg, Oral, 2 times daily      aspirin 81 MG chewable tablet   81 mg, Oral, Daily         cetirizine 10 MG tablet  Commonly known as:  zyrTEC   10 mg, Oral, Daily      cloNIDine 0.1 MG tablet  Commonly known as:  CATAPRES   0.1 mg, Oral, 2 Times Daily PRN      dextromethorphan-guaifenesin  MG/5ML syrup  Commonly known as:  ROBITUSSIN-DM   10 mL, Oral, Every 4 Hours PRN      docusate sodium 100 MG capsule  Commonly known as:  COLACE   100 mg, Oral, Daily      donepezil 10 MG tablet  Commonly known as:  ARICEPT   10 mg, Oral, Daily      fluticasone 50 MCG/ACT nasal spray  Commonly known as:  FLONASE   2 sprays, Nasal, Daily      fluticasone-salmeterol 100-50 MCG/DOSE DISKUS  Commonly known as:  ADVAIR   1 puff, Inhalation, 2 Times Daily - RT      guaiFENesin 600 MG 12 hr tablet  Commonly known as:  MUCINEX   600 mg, Oral, 2 Times Daily      ibuprofen 400 MG tablet  Commonly known as:  ADVIL,MOTRIN   400 mg, Oral, 2 Times Daily PRN      lisinopril 5 MG tablet  Commonly known as:  PRINIVIL,ZESTRIL   15 mg, Oral, Daily, HOLD if SBP<110       melatonin 3 MG tablet   9 mg, Oral, Nightly      memantine 10 MG tablet  Commonly known as:  NAMENDA   10 mg, Oral, 2 Times Daily      metoprolol tartrate 50 MG tablet  Commonly known as:  LOPRESSOR   50 mg, Oral, 2 Times Daily      NON FORMULARY   Calcium-Vitamin d3-Vitamin k 650mg-12.5mcg-40mcg Chew Tablets  Chew 1 tablet QD       pantoprazole 40 MG EC tablet  Commonly known as:  PROTONIX   40 mg, Oral, Daily      polyethylene glycol packet  Commonly known as:  MIRALAX   17 g, Oral, Daily             Allergies   Allergen Reactions   • Abacavir Unknown - Low Severity   • Morphine Unknown - High Severity   • Penicillins Unknown - High Severity   • Singulair  [Montelukast Sodium] Unknown - High Severity         Discharge Disposition:  Rehab Facility or Unit (DC - External)    Diet:  Hospital:  Diet Order   Procedures   • Diet Regular         Discharge Activity:   Activity Instructions     Activity as Tolerated              CODE STATUS:    Code Status and Medical  Interventions:   Ordered at: 06/28/20 1237     Level Of Support Discussed With:    Patient     Code Status:    CPR     Medical Interventions (Level of Support Prior to Arrest):    Full         Follow-up Appointments  No future appointments.    Additional Instructions for the Follow-ups that You Need to Schedule     Call MD With Problems / Concerns   As directed      Instructions: Call [phone] or email [email or web address] for problems or concerns.    Order Comments:  Instructions: Call [phone] or email [email or web address] for problems or concerns.          Discharge Follow-up with PCP   As directed       Currently Documented PCP:    Naseem Clarke MD    PCP Phone Number:    [phone]     Follow Up Details:  1 week         Discharge Follow-up with Specialty: Neurology - pt choice or PCP preference; 1 Month   As directed      Specialty:  Neurology - pt choice or PCP preference    Follow Up:  1 Month    Follow Up Details:  stroke follow-up                 Condition on Discharge:      Stable      This patient has been examined wearing appropriate Personal Protective Equipment 07/01/20      Electronically signed by Aisha Marie MD, 07/01/20, 3:38 PM.      Time: I spent  35  minutes on this discharge activity which included face-to-face encounter with the patient/reviewing the data in the system/coordination of the care with the nursing staff as well as consultants/documentation/entering orders.

## 2020-07-01 NOTE — PLAN OF CARE
Patient has been very confused still tonight. Plans to go to [facility] for rehab then return back to [facility] when finished with rehab.     She was placed on 6 liters per simple face mask while sleeping because her O2 levels kept dropping into the mid 70's and was not corrected with nasal cannula. O2 is mid to upper 90's when patient is awake.     Problem: Patient Care Overview  Goal: Plan of Care Review  Outcome: Ongoing (interventions implemented as appropriate)  Flowsheets (Taken 7/1/2020 3442)  Progress: no change  Plan of Care Reviewed With: patient  Goal: Individualization and Mutuality  Outcome: Ongoing (interventions implemented as appropriate)  Goal: Discharge Needs Assessment  Outcome: Ongoing (interventions implemented as appropriate)  Goal: Interprofessional Rounds/Family Conf  Outcome: Ongoing (interventions implemented as appropriate)     Problem: Stroke (Ischemic) (Adult)  Goal: Signs and Symptoms of Listed Potential Problems Will be Absent, Minimized or Managed (Stroke)  Outcome: Ongoing (interventions implemented as appropriate)     Problem: Fall Risk (Adult)  Goal: Identify Related Risk Factors and Signs and Symptoms  Outcome: Ongoing (interventions implemented as appropriate)  Goal: Absence of Fall  Outcome: Ongoing (interventions implemented as appropriate)     Problem: Skin Injury Risk (Adult)  Goal: Identify Related Risk Factors and Signs and Symptoms  Outcome: Ongoing (interventions implemented as appropriate)  Goal: Skin Health and Integrity  Outcome: Ongoing (interventions implemented as appropriate)

## 2020-07-01 NOTE — PROGRESS NOTES
Continued Stay Note  [facility]     Patient Name: Aida Grey  MRN: [id number]  Today's Date: 7/1/2020    Admit Date: 6/28/2020    Discharge Plan     Row Name 07/01/20 1118       Plan    Plan  [facility] at d/c. Okay to transfer on 7/1. PASRR approved. No pre-cert needed.     Plan Comments  JONI notified [facility] liaison that pt's COVID-19 test came back negative and confirmed that pt can transfer to their facility today. JONI notified RN, CM & MD via Epic Secure Chat.         Aparna Boyd, RUSHW, LSW  PRN   Phone: [phone]

## 2020-07-01 NOTE — SIGNIFICANT NOTE
Pt will d/c to [facility] rehab. Transportation will be provided by pt's daughter. D/c instructions discussed at bedside with charge nurse Juana

## 2020-07-09 ENCOUNTER — TELEPHONE (OUTPATIENT)
Dept: NEUROLOGY | Facility: CLINIC | Age: 85
End: 2020-07-09

## 2020-07-09 NOTE — TELEPHONE ENCOUNTER
Provider: DR. SEIPEL  Caller: GERA LACKEY  Relationship to Patient: PT'S DAUGHTER      Reason for Call: PT'S DAUGHTER CALLING PT WAS ADMITTED IN [facility] ON 6-28-20 AND DISCHARGE ON 7-01-20 AND NOW IS IN REHAB [facility]. PT WAS ADMITTED WITH A STROKE AND HAS DEMENTIA. SHE WAS SEEN BY DR. SANTANA  AND IS TO FOLLOW UP WITH A NEUROLOGIST. PT'S DAUGHTER WANTS HER TO SEE A DOCTOR IN [place]. PLEASE DETERMINE A TIME FRAME FOR PT TO FOLLOW UP. PLEASE CALL HER DAUGHTER -[phone]